# Patient Record
Sex: MALE | Race: WHITE | NOT HISPANIC OR LATINO | Employment: FULL TIME | URBAN - METROPOLITAN AREA
[De-identification: names, ages, dates, MRNs, and addresses within clinical notes are randomized per-mention and may not be internally consistent; named-entity substitution may affect disease eponyms.]

---

## 2017-03-20 ENCOUNTER — TELEPHONE (OUTPATIENT)
Dept: INTERNAL MEDICINE | Facility: CLINIC | Age: 42
End: 2017-03-20

## 2017-03-20 NOTE — TELEPHONE ENCOUNTER
3/20/2017    Call Regarding ReattributionPhysical    Attempt 1    Message on voicemail     Comments:       Outreach   CC

## 2017-03-29 NOTE — TELEPHONE ENCOUNTER
3/29/2017     Call Regarding ReattributionPhysical  Attempt 2     Message on voicemail   Comments:         Outreach   XAVIER

## 2017-04-04 NOTE — TELEPHONE ENCOUNTER
4/4/2017     Call Regarding ReattributionPhysical  Attempt 3     Message on voicemail   Comments:         Outreach   XAVIER

## 2019-03-08 ENCOUNTER — OFFICE VISIT (OUTPATIENT)
Dept: FAMILY MEDICINE | Facility: CLINIC | Age: 44
End: 2019-03-08
Payer: COMMERCIAL

## 2019-03-08 VITALS
TEMPERATURE: 96.4 F | WEIGHT: 182 LBS | HEIGHT: 69 IN | RESPIRATION RATE: 20 BRPM | DIASTOLIC BLOOD PRESSURE: 70 MMHG | HEART RATE: 89 BPM | BODY MASS INDEX: 26.96 KG/M2 | SYSTOLIC BLOOD PRESSURE: 120 MMHG | OXYGEN SATURATION: 98 %

## 2019-03-08 DIAGNOSIS — S89.92XA KNEE INJURY, LEFT, INITIAL ENCOUNTER: ICD-10-CM

## 2019-03-08 DIAGNOSIS — Z83.3 FAMILY HISTORY OF DIABETES MELLITUS: ICD-10-CM

## 2019-03-08 DIAGNOSIS — Z82.49 FAMILY HISTORY OF ISCHEMIC HEART DISEASE: ICD-10-CM

## 2019-03-08 DIAGNOSIS — R73.01 IMPAIRED FASTING GLUCOSE: ICD-10-CM

## 2019-03-08 DIAGNOSIS — E66.3 OVERWEIGHT (BMI 25.0-29.9): ICD-10-CM

## 2019-03-08 DIAGNOSIS — E78.2 MIXED HYPERLIPIDEMIA: ICD-10-CM

## 2019-03-08 DIAGNOSIS — Z12.5 SCREENING FOR PROSTATE CANCER: ICD-10-CM

## 2019-03-08 DIAGNOSIS — Z00.00 ROUTINE GENERAL MEDICAL EXAMINATION AT A HEALTH CARE FACILITY: Primary | ICD-10-CM

## 2019-03-08 LAB
ALT SERPL W P-5'-P-CCNC: 46 U/L (ref 0–70)
CHOLEST SERPL-MCNC: 303 MG/DL
GLUCOSE SERPL-MCNC: 98 MG/DL (ref 70–99)
HDLC SERPL-MCNC: 42 MG/DL
LDLC SERPL CALC-MCNC: 214 MG/DL
NONHDLC SERPL-MCNC: 261 MG/DL
TRIGL SERPL-MCNC: 235 MG/DL

## 2019-03-08 PROCEDURE — 36415 COLL VENOUS BLD VENIPUNCTURE: CPT | Performed by: FAMILY MEDICINE

## 2019-03-08 PROCEDURE — 99396 PREV VISIT EST AGE 40-64: CPT | Performed by: FAMILY MEDICINE

## 2019-03-08 PROCEDURE — G0103 PSA SCREENING: HCPCS | Performed by: FAMILY MEDICINE

## 2019-03-08 PROCEDURE — 80061 LIPID PANEL: CPT | Performed by: FAMILY MEDICINE

## 2019-03-08 PROCEDURE — 82947 ASSAY GLUCOSE BLOOD QUANT: CPT | Performed by: FAMILY MEDICINE

## 2019-03-08 PROCEDURE — 84460 ALANINE AMINO (ALT) (SGPT): CPT | Performed by: FAMILY MEDICINE

## 2019-03-08 PROCEDURE — 99214 OFFICE O/P EST MOD 30 MIN: CPT | Mod: 25 | Performed by: FAMILY MEDICINE

## 2019-03-08 RX ORDER — ATORVASTATIN CALCIUM 40 MG/1
40 TABLET, FILM COATED ORAL DAILY
Qty: 90 TABLET | Refills: 0 | Status: SHIPPED | OUTPATIENT
Start: 2019-03-08 | End: 2019-05-29

## 2019-03-08 RX ORDER — ATORVASTATIN CALCIUM 40 MG/1
40 TABLET, FILM COATED ORAL DAILY
Qty: 90 TABLET | Refills: 3 | Status: CANCELLED | OUTPATIENT
Start: 2019-03-08

## 2019-03-08 ASSESSMENT — MIFFLIN-ST. JEOR: SCORE: 1702.99

## 2019-03-08 NOTE — LETTER
[facility]  [address]  [place] [address]  [phone]                                                                                                           KENAN CAGLE  [address] [place] [address]    March 11, 2019      Dear KENAN,     Please see attached lab results   All of your lab results are normal, except as noted below.     The following are explanations of some of our lab tests     THIS DOES NOT MEAN THAT YOU HAD ALL OF THESE DONE     Please continue on the same medications unless   a change is noted above     These are some general explanations for tests:     HDL Cholesterol - is the good cholesterol and it is good to have it high.   LDL cholesterol is the bad ch   olesterol and it is good to have it low.   It is recommended to have LDL less than 130 for people with hypertension and to have it less than 100 for people with heart disease, diabetes and chronic kidney disease.   Triglycerides are another type of lipid a   nd can also cause heart disease so should be kept low. #####   ####both of the lipids above are high ##### hopefully the statin you are restarting will lower these on rechek in 6 weeks####     Thyroid tests are TSH, T4, T3   Glucose is sugar   A1c is a test that gives us an idea about how well was controlled the diabetes for the last 3 months.   PSA stands for Prostate Specific Antigen and    it can be elevated with prostate cancer or prostate inflammation.     Thank you for choosing [facility].  We appreciate the opportunity to serve you and look forward to supporting your healthcare needs in the future.    If you have any questions or concerns, please call me or my staff at [phone].      Sincerely,    Karla Armando MD    Results for orders placed or performed in visit on 03/08/19   GLUCOSE   Result Value Ref Range    Glucose 98 70 - 99 mg/dL   ALT   Result Value  Ref Range    ALT 46 0 - 70 U/L   Lipid panel reflex to direct LDL Fasting   Result Value Ref Range    Cholesterol 303 (H) <200 mg/dL    Triglycerides 235 (H) <150 mg/dL    HDL Cholesterol 42 >39 mg/dL    LDL Cholesterol Calculated 214 (H) <100 mg/dL    Non HDL Cholesterol 261 (H) <130 mg/dL   Prostate spec antigen screen   Result Value Ref Range    PSA 0.73 0 - 4 ug/L

## 2019-03-08 NOTE — NURSING NOTE
"Chief Complaint   Patient presents with     Physical     /70   Pulse 89   Temp 96.4  F (35.8  C) (Tympanic)   Resp 20   Ht 1.74 m (5' 8.5\")   Wt 82.6 kg (182 lb)   SpO2 98%   BMI 27.27 kg/m   Estimated body mass index is 27.27 kg/m  as calculated from the following:    Height as of this encounter: 1.74 m (5' 8.5\").    Weight as of this encounter: 82.6 kg (182 lb).  BP completed using cuff size: maru Finley CMA    Health Maintenance Due   Topic Date Due     PHQ-2 Q1 YR  07/01/1987     HIV SCREEN (SYSTEM ASSIGNED)  07/01/1993     LIPID MONITORING Q1 YEAR  04/25/2015     INFLUENZA VACCINE (1) 09/01/2018     Health Maintenance reviewed at today's visit patient asked to schedule/complete:   Immunizations:  Patient agrees to schedule    "

## 2019-03-08 NOTE — PATIENT INSTRUCTIONS
Preventive Health Recommendations  Male Ages 40 to 49    Yearly exam:             See your health care provider every year in order to  o   Review health changes.   o   Discuss preventive care.    o   Review your medicines if your doctor has prescribed any.    You should be tested each year for STDs (sexually transmitted diseases) if you re at risk.     Have a cholesterol test every 5 years.     Have a colonoscopy (test for colon cancer) if someone in your family has had colon cancer or polyps before age 50.     After age 45, have a diabetes test (fasting glucose). If you are at risk for diabetes, you should have this test every 3 years.      Talk with your health care provider about whether or not a prostate cancer screening test (PSA) is right for you.    Shots: Get a flu shot each year. Get a tetanus shot every 10 years.     Nutrition:    Eat at least 5 servings of fruits and vegetables daily.     Eat whole-grain bread, whole-wheat pasta and brown rice instead of white grains and rice.     Get adequate Calcium and Vitamin D.     Lifestyle    Exercise for at least 150 minutes a week (30 minutes a day, 5 days a week). This will help you control your weight and prevent disease.     Limit alcohol to one drink per day.     No smoking.     Wear sunscreen to prevent skin cancer.     See your dentist every six months for an exam and cleaning.      1.  Weight Loss Tips  1. Do not eat after 6 hrs before your expected bedtime  2. Have your heaviest meal for breakfast, a slightly lighter meal at lunch and a snack 6 hrs before bed  3. No sugar/calorie drinks except milk ie no fruit juice, pop, alcohol.  4. Drink milk 30min before meals to decrease your hunger. Also it is excellent as part of your last meal of the day snack  5. Drink lots of water  6. Increase fiber in diet: all bran cereal, salads, popcorn etc  7. Have only one small serving of fruit a day about 1/2 cup (as this is high in sugar)  8. EXERCISE is the bottom  line. Without it, you will gain weight even on a low calorie diet. Best if done 2-3X a day as can    Being overweight contributes to high blood pressure and high cholesterol, both of which cause heart attacks, strokes and kidney failure, prediabetes and diabetes, arthritis, and liver disease     3. .The only way known to prevent diabetes or keep it from getting worse is exercise, 20-40 minutes 3 times a day around the time of meals as your insulin is wearing out  You need to get rid of the sugar using your muscles     5.  The future lab has been ordered. Please call us at [phone] to make a lab appointment.     In 6-8 weeks by 5-11-19    6. Please do your breast exam every mo, when you  Change the  calendar page or set an alarm on your cell phone Do a  visual check for dimples, inversion or indentation or any different position of the nipple Feel manually  for any 1cm or larger  size mass ie about the size of an almond Be sure to cover the entire area of both breasts : this extends back to the back on either side and from the collar bone to the bottom of the breasts where you can begin to feel ribs.  Men are advised to do this exam also as they now have a higher rate of breast cancer , like women do .     5. You could do leg lifts to strengthen your knees     6. [facility]  [facility] [address]

## 2019-03-08 NOTE — PROGRESS NOTES
SUBJECTIVE:   CC: KENAN CAGLE is an 43 year old male who presents for preventative health visit.     Physical   Annual:     Getting at least 3 servings of Calcium per day:  Yes    Bi-annual eye exam:  NO    Dental care twice a year:  Yes    Sleep apnea or symptoms of sleep apnea:  None    Diet:  Regular (no restrictions)    Frequency of exercise:  2-3 days/week    Duration of exercise:  15-30 minutes    Taking medications regularly:  Yes    Medication side effects:  None    Additional concerns today:  Yes    PHQ-2 Total Score: 0    Musculoskeletal problem/pain: RT post lat  knee pain c subcutaneous heme from tendon rupture bleed   & hemangioma med post       Duration: mid feb , 2019     Plays hockey 2x a wk     Gibson/felt a pop & hurt immediately and only slightly better post 1-2 days but increased pain with twisting     Description  Location: above     Intensity:  moderate, 7/10--limits his hockey playing as the pain inhibits certain movements    Accompanying signs and symptoms: none    History  Previous similar problem: no   Previous evaluation:  none    Precipitating or alleviating factors:  Trauma or overuse: YES  Aggravating factors include: twisting while wt bearing     Therapies tried and outcome: rest/inactivity    MIxed Hyperlipidemia Follow-Up      Rate your low fat/cholesterol diet?: fair    Taking statin?  Stopped due to did not f/u with MD     Atorvastatin 40mgm off & on since 2015     LDL down to 106 in 2014--when presumably on the statin     Trig = 188+and was 164 in 2014    Other lipid medications/supplements?:  Fish oil/Omega 3, dose 1000mgm with side effects-did not like            Today's PHQ-2 Score:   PHQ-2 ( 1999 [employer]) 3/8/2019   Q1: Little interest or pleasure in doing things 0   Q2: Feeling down, depressed or hopeless 0   PHQ-2 Score 0   Q1: Little interest or pleasure in doing things Not at all   Q2: Feeling down, depressed or hopeless Not at all   PHQ-2 Score 0     Abuse: Current or  "Past(Physical, Sexual or Emotional)- No  Do you feel safe in your environment? Yes    Social History     Tobacco Use     Smoking status: Never Smoker     Smokeless tobacco: Never Used   Substance Use Topics     Alcohol use: Yes     Alcohol/week: 2.0 oz     Comment: once weekly, maybe two drinks with dinner     Alcohol Use 3/8/2019   If you drink alcohol do you typically have greater than 3 drinks per day OR greater than 7 drinks per week? Yes   No flowsheet data found.    Last PSA: No results found for: PSA    Reviewed orders with patient. Reviewed health maintenance and updated orders accordingly - Yes  Labs reviewed in EPIC    Reviewed and updated as needed this visit by clinical staff  Tobacco  Allergies  Meds  Problems  Med Hx  Surg Hx  Fam Hx  Soc Hx          Reviewed and updated as needed this visit by Provider  Tobacco  Allergies  Meds  Problems  Med Hx  Surg Hx  Fam Hx            Review of Systems  CONSTITUTIONAL: NEGATIVE for fever, chills, change in weight  INTEGUMENTARY/SKIN: NEGATIVE for worrisome rashes, moles or lesions  EYES: NEGATIVE for vision changes or irritation  ENT: NEGATIVE for ear, mouth and throat problems  RESP: NEGATIVE for significant cough or SOB  CV: NEGATIVE for chest pain, palpitations or peripheral edema  GI: NEGATIVE for nausea, abdominal pain, heartburn, or change in bowel habits   male: negative for dysuria, hematuria, decreased urinary stream, erectile dysfunction, urethral discharge  MUSCULOSKELETAL:NEGATIVE for significant arthralgias or myalgia, POSITIVE  for  and joint stiffness Rt knee   NEURO: NEGATIVE for weakness, dizziness or paresthesias  ENDOCRINE: NEGATIVE for temperature intolerance, skin/hair changes  HEME/ALLERGY/IMMUNE: NEGATIVE for bleeding problems  PSYCHIATRIC: NEGATIVE for changes in mood or affect    OBJECTIVE:   /70   Pulse 89   Temp 96.4  F (35.8  C) (Tympanic)   Resp 20   Ht 1.74 m (5' 8.5\")   Wt 82.6 kg (182 lb)   SpO2 98%   " BMI 27.27 kg/m      Physical Exam  GENERAL: healthy, alert, no distress and over weight  EYES: Eyes grossly normal to inspection, PERRL and conjunctivae and sclerae normal  NECK: no adenopathy, no asymmetry, masses, or scars and thyroid normal to palpation  RESP: lungs clear to auscultation - no rales, rhonchi or wheezes  CV: regular rate and rhythm, normal S1 S2, no S3 or S4, no murmur, click or rub, no peripheral edema and peripheral pulses strong  ABDOMEN: soft, nontender, no hepatosplenomegaly, no masses and bowel sounds normal  MS: no gross musculoskeletal defects noted, no edema POS 8 cm disc of yellow bruise on skin of Rt knee lat at level of knee joint and proximal --nontender but pain with eversion of ft andlower leg -no edema   SKIN: no suspicious lesions or rashes POS map like red capillary form on post med Rt knee -nontender   NEURO: Normal strength and tone, mentation intact and speech normal  PSYCH: mentation appears normal, affect normal/bright  LYMPH: no cervical, supraclavicular, axillary, or inguinal adenopathy    Diagnostic Test Results:  Results for orders placed or performed in visit on 04/25/14   Lipid Profile   Result Value Ref Range    Cholesterol 179 <200 mg/dL    Triglycerides 164 (H) 0 - 150 mg/dL    HDL Cholesterol 40 (L) >40 mg/dL    LDL Cholesterol Calculated 106 0 - 129 mg/dL    VLDL-Cholesterol 33 (H) 0 - 30 mg/dL    Cholesterol/HDL Ratio 4.5 0.0 - 5.0   ALT   Result Value Ref Range    ALT 58 0 - 70 U/L   AST   Result Value Ref Range    AST 40 0 - 45 U/L       ASSESSMENT/PLAN:       ICD-10-CM    1. Routine general medical examination at a health care facility Z00.00    2. Knee injury, left, initial encounter-tendon pull post lat in mid 2-19  S89.92XA    3. Mixed hyperlipidemia-issue of restarting statin E78.2 ALT     Lipid panel reflex to direct LDL Fasting   4. Impaired fasting glucose R73.01 GLUCOSE   5. Family history of diabetes mellitus: mom at 60  Z83.3    6. Family history of  "ischemic heart disease: fr w MI 56y/o Z82.49    7. Screening for prostate cancer Z12.5 Prostate spec antigen screen   8. Overweight (BMI 25.0-29.9) E66.3 ALT       COUNSELING:   Reviewed preventive health counseling, as reflected in patient instructions       Regular exercise       Healthy diet/nutrition       Vision screening    BP Readings from Last 1 Encounters:   03/08/19 120/70     Estimated body mass index is 27.27 kg/m  as calculated from the following:    Height as of this encounter: 1.74 m (5' 8.5\").    Weight as of this encounter: 82.6 kg (182 lb).      Weight management plan: Discussed healthy diet and exercise guidelines     reports that  has never smoked. he has never used smokeless tobacco.     Patient Instructions     Preventive Health Recommendations  Male Ages 40 to 49    Yearly exam:             See your health care provider every year in order to  o   Review health changes.   o   Discuss preventive care.    o   Review your medicines if your doctor has prescribed any.    You should be tested each year for STDs (sexually transmitted diseases) if you re at risk.     Have a cholesterol test every 5 years.     Have a colonoscopy (test for colon cancer) if someone in your family has had colon cancer or polyps before age 50.     After age 45, have a diabetes test (fasting glucose). If you are at risk for diabetes, you should have this test every 3 years.      Talk with your health care provider about whether or not a prostate cancer screening test (PSA) is right for you.    Shots: Get a flu shot each year. Get a tetanus shot every 10 years.     Nutrition:    Eat at least 5 servings of fruits and vegetables daily.     Eat whole-grain bread, whole-wheat pasta and brown rice instead of white grains and rice.     Get adequate Calcium and Vitamin D.     Lifestyle    Exercise for at least 150 minutes a week (30 minutes a day, 5 days a week). This will help you control your weight and prevent disease.     Limit " alcohol to one drink per day.     No smoking.     Wear sunscreen to prevent skin cancer.     See your dentist every six months for an exam and cleaning.      1.  Weight Loss Tips  1. Do not eat after 6 hrs before your expected bedtime  2. Have your heaviest meal for breakfast, a slightly lighter meal at lunch and a snack 6 hrs before bed  3. No sugar/calorie drinks except milk ie no fruit juice, pop, alcohol.  4. Drink milk 30min before meals to decrease your hunger. Also it is excellent as part of your last meal of the day snack  5. Drink lots of water  6. Increase fiber in diet: all bran cereal, salads, popcorn etc  7. Have only one small serving of fruit a day about 1/2 cup (as this is high in sugar)  8. EXERCISE is the bottom line. Without it, you will gain weight even on a low calorie diet. Best if done 2-3X a day as can    Being overweight contributes to high blood pressure and high cholesterol, both of which cause heart attacks, strokes and kidney failure, prediabetes and diabetes, arthritis, and liver disease     3. .The only way known to prevent diabetes or keep it from getting worse is exercise, 20-40 minutes 3 times a day around the time of meals as your insulin is wearing out  You need to get rid of the sugar using your muscles     5.  The future lab has been ordered. Please call us at [phone] to make a lab appointment.     In 6-8 weeks by 5-11-19    6. Please do your breast exam every mo, when you  Change the  calendar page or set an alarm on your cell phone Do a  visual check for dimples, inversion or indentation or any different position of the nipple Feel manually  for any 1cm or larger  size mass ie about the size of an almond Be sure to cover the entire area of both breasts : this extends back to the back on either side and from the collar bone to the bottom of the breasts where you can begin to feel ribs.  Men are advised to do this exam also as they now have a higher rate of breast cancer ,  like women do .     5. You could do leg lifts to strengthen your knees     6. [facility]  [facility] [address]     As this is important to pt to play hockey     Discussed all with pt  And the patient expresses understanding    Time spent with the patient 28mins, more than 50% in counseling and coordinating care, Re above medical problems.    1. HIGH LDL   -+ fam hx infr of MI at 55y/o   -on statin in past without problem   -realizes the risk and wants to restart   -discussed the f/u necessary to be sure we get the LDL down and the liver is OK & needs routine f/u   -needs to stay on the statin this time   -need to consider further cardiac work up     2. RT KNEE pain  -injury occurred at hockey with a sudden movement   -has subcutaneous heme-likely from a partially torn tendon that bled and the blood  came to the surface   - the red reticulated skin lesion more medially is more likely a hemangioma           Counseling Resources:  ATP IV Guidelines  Pooled Cohorts Equation Calculator  FRAX Risk Assessment  ICSI Preventive Guidelines  Dietary Guidelines for Americans, 2010  USDA's MyPlate  ASA Prophylaxis  Lung CA Screening    Karla Armando MD  [facility]

## 2019-03-09 LAB — PSA SERPL-ACNC: 0.73 UG/L (ref 0–4)

## 2019-03-10 NOTE — RESULT ENCOUNTER NOTE
Please see attached lab results  All of your lab results are normal, except as noted below.    The following are explanations of some of our lab tests    THIS DOES NOT MEAN THAT YOU HAD ALL OF THESE DONE    Please continue on the same medications unless   a change is noted above    These are some general explanations for tests:    Hgb is the blood iron level  WBC means White Blood Cells  Platelets are small blood cells that help with forming the blood clots along with other blood factors.  Electrolytes ar  e Sodium, Potassium, Calcium, Magnesium, Phosphorus.  Liver tests are: AST, ALT, Bilirubin, Alkaline Phosphatase.  Kidney tests are Creatinine, GFR.  HDL Cholesterol - is the good cholesterol and it is good to have it high.  LDL cholesterol is the bad ch  olesterol and it is good to have it low.  It is recommended to have LDL less than 130 for people with hypertension and to have it less than 100 for people with heart disease, diabetes and chronic kidney disease.  Triglycerides are another type of lipid a  nd can also cause heart disease so should be kept low. #####  ####both of the lipids above are high ##### hopefully the statin you are restarting will lower these on rechek in 6 weeks####    Thyroid tests are TSH, T4, T3  Glucose is sugar  A1c is a test that gives us an idea about how well was controlled the diabetes for the last 3 months.   PSA stands for Prostate Specific Antigen and   it can be elevated with prostate cancer or prostate inflammation.

## 2019-05-29 DIAGNOSIS — E78.2 MIXED HYPERLIPIDEMIA: Primary | ICD-10-CM

## 2019-05-29 NOTE — TELEPHONE ENCOUNTER
"ATORVASTATIN 40 MG TABLET  Last Written Prescription Date:  03/08/2019  Last Fill Quantity: 90,  # refills: 0   Last office visit: 3/8/2019 with prescribing provider:  03/08/2019   Future Office Visit:    Requested Prescriptions   Pending Prescriptions Disp Refills     atorvastatin (LIPITOR) 40 MG tablet [Pharmacy Med Name: ATORVASTATIN 40 MG TABLET] 90 tablet 0     Sig: TAKE 1 TABLET BY MOUTH DAILY, NEEDS LAB FOR MORE BY 6+-8 WEEKS       Statins Protocol Passed - 5/29/2019 10:34 AM        Passed - LDL on file in past 12 months     Recent Labs   Lab Test 03/08/19  1022   *             Passed - No abnormal creatine kinase in past 12 months     No lab results found.             Passed - Recent (12 mo) or future (30 days) visit within the authorizing provider's specialty     Patient had office visit in the last 12 months or has a visit in the next 30 days with authorizing provider or within the authorizing provider's specialty.  See \"Patient Info\" tab in inbasket, or \"Choose Columns\" in Meds & Orders section of the refill encounter.              Passed - Medication is active on med list        Passed - Patient is age 18 or older          "

## 2019-05-31 RX ORDER — ATORVASTATIN CALCIUM 40 MG/1
TABLET, FILM COATED ORAL
Qty: 90 TABLET | Refills: 0 | Status: SHIPPED | OUTPATIENT
Start: 2019-05-31 | End: 2019-06-06

## 2019-05-31 RX ORDER — EZETIMIBE 10 MG/1
10 TABLET ORAL DAILY
Qty: 90 TABLET | Refills: 0 | Status: SHIPPED | OUTPATIENT
Start: 2019-05-31 | End: 2019-08-31

## 2019-05-31 NOTE — TELEPHONE ENCOUNTER
LDL too hi > 200  On atorvastatin 40mgm   Will add zetia 10mgm and rechek lab in 6-8 weeks  Hard copy to station 2, BX    .

## 2019-05-31 NOTE — TELEPHONE ENCOUNTER
Patient scheduled for lab only appointment. States he has been on medication already for 6-8 weeks. No further action needed.

## 2019-06-05 DIAGNOSIS — E78.2 MIXED HYPERLIPIDEMIA: ICD-10-CM

## 2019-06-05 PROCEDURE — 84460 ALANINE AMINO (ALT) (SGPT): CPT | Performed by: FAMILY MEDICINE

## 2019-06-05 PROCEDURE — 36415 COLL VENOUS BLD VENIPUNCTURE: CPT | Performed by: FAMILY MEDICINE

## 2019-06-05 PROCEDURE — 80061 LIPID PANEL: CPT | Performed by: FAMILY MEDICINE

## 2019-06-06 DIAGNOSIS — E78.2 MIXED HYPERLIPIDEMIA: Primary | ICD-10-CM

## 2019-06-06 LAB
ALT SERPL W P-5'-P-CCNC: 58 U/L (ref 0–70)
CHOLEST SERPL-MCNC: 233 MG/DL
HDLC SERPL-MCNC: 47 MG/DL
LDLC SERPL CALC-MCNC: 121 MG/DL
NONHDLC SERPL-MCNC: 186 MG/DL
TRIGL SERPL-MCNC: 327 MG/DL

## 2019-06-06 RX ORDER — ATORVASTATIN CALCIUM 80 MG/1
80 TABLET, FILM COATED ORAL DAILY
Qty: 90 TABLET | Refills: 0 | Status: SHIPPED | OUTPATIENT
Start: 2019-06-06 | End: 2019-08-31

## 2019-08-31 DIAGNOSIS — E78.2 MIXED HYPERLIPIDEMIA: ICD-10-CM

## 2019-09-03 RX ORDER — EZETIMIBE 10 MG/1
TABLET ORAL
Qty: 90 TABLET | Refills: 1 | Status: SHIPPED | OUTPATIENT
Start: 2019-09-03 | End: 2020-06-23

## 2019-09-03 RX ORDER — ATORVASTATIN CALCIUM 80 MG/1
TABLET, FILM COATED ORAL
Qty: 90 TABLET | Refills: 1 | Status: SHIPPED | OUTPATIENT
Start: 2019-09-03 | End: 2020-06-23

## 2019-09-03 NOTE — TELEPHONE ENCOUNTER
"Requested Prescriptions   Pending Prescriptions Disp Refills     atorvastatin (LIPITOR) 80 MG tablet [Pharmacy Med Name: ATORVASTATIN 80 MG TABLET]  Last Written Prescription Date:  6/6/2019  Last Fill Quantity: 90 tablet,  # refills: 0   Last office visit: 3/8/2019 with prescribing provider:  MELISSA Armando   Future Office Visit:     90 tablet 0     Sig: TAKE 1 TABLET BY MOUTH EVERY DAY       Statins Protocol Passed - 8/31/2019 12:31 PM        Passed - LDL on file in past 12 months     Recent Labs   Lab Test 06/05/19  1016   *             Passed - No abnormal creatine kinase in past 12 months     No lab results found.             Passed - Recent (12 mo) or future (30 days) visit within the authorizing provider's specialty     Patient had office visit in the last 12 months or has a visit in the next 30 days with authorizing provider or within the authorizing provider's specialty.  See \"Patient Info\" tab in inbasket, or \"Choose Columns\" in Meds & Orders section of the refill encounter.              Passed - Medication is active on med list        Passed - Patient is age 18 or older        ezetimibe (ZETIA) 10 MG tablet [Pharmacy Med Name: EZETIMIBE 10 MG TABLET]  Last Written Prescription Date:  5/31/2019  Last Fill Quantity: 90 tablet,  # refills: 0   Last office visit: 3/8/2019 with prescribing provider:  MELISSA Armando   Future Office Visit:     90 tablet 0     Sig: TAKE 1 TABLET BY MOUTH EVERY DAY       Antihyperlipidemic agents Passed - 8/31/2019 12:31 PM        Passed - Lipid panel on file in past 12 mos     Recent Labs   Lab Test 06/05/19  1016  04/25/14  1039   CHOL 233*   < > 179   TRIG 327*   < > 164*   HDL 47   < > 40*   *   < > 106   NHDL 186*   < >  --    VLDL  --   --  33*   CHOLHDLRATIO  --   --  4.5    < > = values in this interval not displayed.               Passed - Normal serum ALT on record in past 12 mos     Recent Labs   Lab Test 06/05/19  1016   ALT 58             Passed - Recent (12 " "mo) or future (30 days) visit within the authorizing provider's specialty     Patient had office visit in the last 12 months or has a visit in the next 30 days with authorizing provider or within the authorizing provider's specialty.  See \"Patient Info\" tab in inbasket, or \"Choose Columns\" in Meds & Orders section of the refill encounter.              Passed - Medication is active on med list        Passed - Patient is age 18 years or older           "

## 2019-09-28 ENCOUNTER — HEALTH MAINTENANCE LETTER (OUTPATIENT)
Age: 44
End: 2019-09-28

## 2020-03-23 ENCOUNTER — TRANSFERRED RECORDS (OUTPATIENT)
Dept: HEALTH INFORMATION MANAGEMENT | Facility: CLINIC | Age: 45
End: 2020-03-23

## 2020-04-16 ENCOUNTER — TELEPHONE (OUTPATIENT)
Dept: FAMILY MEDICINE | Facility: CLINIC | Age: 45
End: 2020-04-16

## 2020-04-16 DIAGNOSIS — E78.2 MIXED HYPERLIPIDEMIA: ICD-10-CM

## 2020-04-16 NOTE — TELEPHONE ENCOUNTER
"Requested Prescriptions   Pending Prescriptions Disp Refills     ezetimibe (ZETIA) 10 MG tablet [Pharmacy Med Name: EZETIMIBE 10 MG TABLET] 90 tablet 1     Sig: TAKE 1 TABLET BY MOUTH EVERY DAY       Antihyperlipidemic agents Failed - 4/16/2020 12:25 AM        Failed - Recent (12 mo) or future (30 days) visit within the authorizing provider's specialty     Patient has had an office visit with the authorizing provider or a provider within the authorizing providers department within the previous 12 mos or has a future within next 30 days. See \"Patient Info\" tab in inbasket, or \"Choose Columns\" in Meds & Orders section of the refill encounter.              Passed - Lipid panel on file in past 12 mos     Recent Labs   Lab Test 06/05/19  1016  04/25/14  1039   CHOL 233*   < > 179   TRIG 327*   < > 164*   HDL 47   < > 40*   *   < > 106   NHDL 186*   < >  --    VLDL  --   --  33*   CHOLHDLRATIO  --   --  4.5    < > = values in this interval not displayed.               Passed - Normal serum ALT on record in past 12 mos     Recent Labs   Lab Test 06/05/19  1016   ALT 58             Passed - Medication is active on med list        Passed - Patient is age 18 years or older           atorvastatin (LIPITOR) 80 MG tablet [Pharmacy Med Name: ATORVASTATIN 80 MG TABLET] 90 tablet 1     Sig: TAKE 1 TABLET BY MOUTH EVERY DAY       Statins Protocol Failed - 4/16/2020 12:25 AM        Failed - Recent (12 mo) or future (30 days) visit within the authorizing provider's specialty     Patient has had an office visit with the authorizing provider or a provider within the authorizing providers department within the previous 12 mos or has a future within next 30 days. See \"Patient Info\" tab in inbasket, or \"Choose Columns\" in Meds & Orders section of the refill encounter.              Passed - LDL on file in past 12 months     Recent Labs   Lab Test 06/05/19  1016   *             Passed - No abnormal creatine kinase in past 12 " months     No lab results found.             Passed - Medication is active on med list        Passed - Patient is age 18 or older           Routing refill request to provider for review/approval because:  Patient needs to be seen because it has been more than 1 year since last office visit.

## 2020-04-17 RX ORDER — ATORVASTATIN CALCIUM 80 MG/1
TABLET, FILM COATED ORAL
Qty: 90 TABLET | Refills: 1 | OUTPATIENT
Start: 2020-04-17

## 2020-04-17 RX ORDER — EZETIMIBE 10 MG/1
TABLET ORAL
Qty: 90 TABLET | Refills: 1 | OUTPATIENT
Start: 2020-04-17

## 2020-04-17 NOTE — TELEPHONE ENCOUNTER
Tried to call pt, no answer and no way to LVM. Will try again later. Pt not actively viewing [email or web address] so I did not send a message that way.

## 2020-04-17 NOTE — TELEPHONE ENCOUNTER
Called pt again and no answer and no way to LVM. Closing this encounter until patient reaches out to us.

## 2020-06-23 ENCOUNTER — VIRTUAL VISIT (OUTPATIENT)
Dept: FAMILY MEDICINE | Facility: CLINIC | Age: 45
End: 2020-06-23
Payer: COMMERCIAL

## 2020-06-23 DIAGNOSIS — E78.2 MIXED HYPERLIPIDEMIA: Primary | ICD-10-CM

## 2020-06-23 DIAGNOSIS — Z11.4 ENCOUNTER FOR SCREENING FOR HIV: ICD-10-CM

## 2020-06-23 PROCEDURE — 99213 OFFICE O/P EST LOW 20 MIN: CPT | Mod: GT | Performed by: INTERNAL MEDICINE

## 2020-06-23 RX ORDER — ATORVASTATIN CALCIUM 80 MG/1
80 TABLET, FILM COATED ORAL DAILY
Qty: 90 TABLET | Refills: 1 | Status: SHIPPED | OUTPATIENT
Start: 2020-06-23 | End: 2021-01-07

## 2020-06-23 RX ORDER — EZETIMIBE 10 MG/1
10 TABLET ORAL DAILY
Qty: 90 TABLET | Refills: 1 | Status: SHIPPED | OUTPATIENT
Start: 2020-06-23 | End: 2021-01-07

## 2020-06-23 NOTE — PATIENT INSTRUCTIONS
Please do the lab work ordered. Medications sent to the pharmacy.     Please take the medications regularly and do the labs after 4 weeks for improvement.    =============================    Patient Education     Cholesterol Medicines    Your healthcare provider has prescribed medicine to help control your cholesterol. This sheet tells you how cholesterol affects your health. It also explains how medicines can help improve your cholesterol levels.  Understanding cholesterol  Cholesterol is a type of fat (lipid) that s carried in the blood. Your body makes cholesterol in the liver. You also get it from certain foods. Your body needs some cholesterol to stay healthy. But high cholesterol makes more plaque build up in blood vessels. Plaque is a fatty substance. Over time, this plaque narrows and hardens the blood vessels. This reduces or blocks blood flow in these vessels and raises your risk for heart attack (acute myocardial infarction, or AMI), stroke, and other health problems. This is known as atherosclerotic cardiovascular disease (ASCVD).  Types of lipids  Your blood has 3 key fats:    LDL (low-density lipoprotein) cholesterol. This is called  bad  because it can cause plaque to build up in the blood vessels.    HDL (high-density lipoprotein) cholesterol. This is called  good  because it helps get rid of harmful cholesterol in the bloodstream.    Triglycerides. Your body uses this form of fat to store energy. Like LDL cholesterol, this fat can cause plaque to build up in the blood vessels.  Healthy cholesterol levels  You can find out your levels by having a blood test. High blood cholesterol is a risk factor for getting ASCVD. Talk to your healthcare provider about what levels are best for you. To find out more about cholesterol levels, visit www.heart.org/cholesterol.  You may need your cholesterol levels checked at regular intervals. This is to see if you are meeting the cholesterol goals you and your  provider have agreed on. Make sure you know what this schedule will be and how to get ready for this test.  Types of cholesterol medicines  Medicines can help control the amount of cholesterol in the blood. There are several types. Each controls cholesterol in a different way. They also have different effects in terms of how well they work to lower cholesterol and reduce death rates. Discuss these effects with your healthcare provider. Your healthcare provider will prescribe the type of medicine that is best for you. Medicines may be used alone or combined. The main types are:    Statins (HMG-CoA reductase inhibitors). Statins are thought to be the best at lowering cholesterol. They do this by keeping your body from making cholesterol. Benefits: Statins lower LDL cholesterol. They also slightly raise HDL cholesterol and lower triglycerides.    Selective cholesterol absorption inhibitors. These prevent your body from taking cholesterol from food. They may be prescribed to use alone or with a statin. Benefits: These medicines lower LDL cholesterol. They also slightly raise HDL cholesterol and lower triglycerides.    Resins (bile acid sequestrants or bile acid-binding medicines). Resins help you get rid of cholesterol through the intestines. They work by binding to bile. Bile is a substance that helps the body digest food. Your body uses cholesterol to make bile. Normally, most bile is absorbed by the body during digestion. But when bile is bound to resin, it is eliminated from the body. So the body must make more bile. To do this, the body takes up more cholesterol from the blood. Benefits: Resins lower LDL cholesterol.    Fibrates (fibric acid derivatives). These are best at cutting back on how much triglycerides your body makes. They don t work well to lower LDL. Benefits: Fibrates lower triglycerides. They also raise HDL cholesterol.    Niacin (nicotinic acid). Niacin (vitamin B-3) affects how the liver makes blood  fats. But don t use over-the-counter niacin for cholesterol problems. It isn t regulated by the FDA. Benefits: Niacin raises HDL cholesterol. It also lowers triglycerides and LDL cholesterol.    Omega-3 fatty acids. These reduce the amount of triglycerides your body makes. They also help to clear these lipids from the blood. Omega-3 fatty acids are found in many foods. These include salmon and other oily fish, and walnuts. Your healthcare provider may prescribe these fatty acids in capsule form. Benefits: Omega-3s lower triglycerides. (Note: They may increase LDL cholesterol in some patients.)     PCSK9 inhibitors. These medicines lower LDL cholesterol levels by breaking down the chemicals in the liver that control making LDL cholesterol. These medicines are given by an injection. They are used for people who have an inherited form of high cholesterol (familial hypercholesterolemia). They are also for people who have a hard time controlling their cholesterol with other medicines.  High-risk groups  Some people may need to take medicines called statins to control their cholesterol. This is in addition to eating a healthy diet and getting regular exercise.  Statins can help you stay healthy. They can also help prevent a heart attack or stroke. You may need to take a statin if you are in one of these groups:    Adults who have had a heart attack or stroke. Or adults who have had peripheral vascular disease, a ministroke (transient ischemic attack), or stable or unstable angina. This group also includes people who have had a procedure to restore blood flow through a blocked artery. These procedures include percutaneous coronary intervention, angioplasty, stent, and open-heart bypass surgery.    Adults who have diabetes. Or adults who are at higher risk of having a heart attack or stroke and have an LDL cholesterol level of 70 to 189 mg/dL    Adults who are 21 years old or older and have an LDL cholesterol level of 190  mg/dL or higher.  If you are in a high-risk group, talk with your healthcare provider about your treatment goals. Make sure you understand why these goals are important, based on your own health history and your family history of heart disease or high cholesterol.  Make a plan to have regular cholesterol checks. You may need to fast before getting this test. Also ask your provider about any side effects your medicines may cause. Let your provider know about any side effects you have. You may need to take more than one medicine to reach the cholesterol goals that you and your provider decide on.  Taking cholesterol medicines  Take your medication exactly as your healthcare provider tells you to. This will help it work best. Here are tips for taking cholesterol medicines:    Know when and how to take your medicines. Some may need to be taken with food. Others may need to be taken on an empty stomach or at a certain time of day.    Stick to a schedule. Try the following:  ? Don t skip doses or stop taking your medicine. This is important even if you feel better or if your cholesterol numbers improve.  ? Set things up to help you remember. For instance, work taking your medicines into your routine. You could plan to take them when you get up in the morning or when you go to bed at night.  ? Keep track of what you take. You may take a few different medicines. If so, a list or chart can help you take the right pills at the right time. A pillbox with days of the week or times of day is also a good tool for keeping track.    Prevent medicine interactions. Some medicines and supplements can interact with one another. This means they affect how other medicines work when taken together. Be sure to tell your healthcare provider about all other medicines you take. This includes vitamins, herbs, and over-the-counter medicines.    Know how to deal with side effects. Many people have side effects when they first start taking a  medicine. These are things like headache and stomach upset. Side effects should go away in a few weeks. Tell your healthcare provider about any side effects you have. Certain side effects should be reported to your healthcare provider right away. These include yellowing of the eyes and blurred vision. Also report muscle aches and breathing problems.  If you are pregnant or breastfeeding, tell your health care provider before taking any cholesterol medicines.  A healthy lifestyle  Your healthcare provider will help you make changes your lifestyle if needed. These changes are needed to help you lower your cholesterol and keep the ASCVD from getting worse. Things you may need to work on are:  Diet  Your healthcare provider will give you information on changes that you may need to make to your diet. You may need to see a registered dietitian or nutritionist for help with these changes. You might be asked to:    Eat less meat containing saturated fat and high levels of cholesterol    Eat less sodium (salt), especially if you have high blood pressure    Eat more fresh vegetables and fruits    Eat lean protein, like fish, chicken and turkey, and beans and peas    Eat less processed meats, like deli meats, sausage, and pepperoni    Choose non-fat and low-fat milk, yogurt and cheese    Use vegetable and nut oils instead of butter, shortening, and margarine    Limit sweets and packaged foods, like chips, cookies, and baked goods    Limit eating out at restaurants and eating fast foods  Physical activity  Your healthcare provider may ask you to be more active. Depending on your health, your provider may recommend that you get moderate to vigorous intensity exercise for at least 40 minutes each day, 3 to 4 days each week. Some examples of moderate to vigorous exercise are:    Walking at a brisk pace, about 3-4 miles per hour    Jogging or running    Riding a bicycle or stationary bike    Swimming or water  aerobics    Dancing    Hiking    Martial arts    Tennis  You may not be able to do 40 minutes right away. You may have to start with 5 to 10 minutes a day, 2 times a day, and then keep adding to it until you can do 40 minutes.  Weight management  If you are overweight or obese, your healthcare provider may tell you to lose weight and lower your BMI (body mass index). Changing your diet and getting more exercise can help. Controlling the number of calories you eat is the best way to lose weight.  Smoking  If you smoke, quit now. Ask your health care provider for information on medications that can help you fight cravings. Enroll in a stop-smoking program to improve your chances of success. Ask your provider for smoking-cessation referrals and products.  Stress  Learn ways to help you deal with stress in your home and work life. Here are a few ideas:    Take a yoga class. You can find classes at local community centers or on the Internet.    Get regular exercise. Exercise helps your mind let go of problems and release stress in your muscles.    Deep breathing. Take a few minutes several times a day to just sit quietly and breathe. Concentrate on the air going into and out of your body.    Talk with loved ones. Take a few minutes each day to connect with people that make you feel supported.  Date Last Reviewed: 2/1/2017 2000-2019 [employer] [address] [facility], [place] [address]. All rights reserved. This information is not intended as a substitute for professional medical care. Always follow your healthcare professional's instructions.

## 2020-06-23 NOTE — PROGRESS NOTES
"KENAN CAGLE is a 44 year old male who is being evaluated via a billable video visit.      The patient has been notified of following:     \"This video visit will be conducted via a call between you and your physician/provider. We have found that certain health care needs can be provided without the need for an in-person physical exam.  This service lets us provide the care you need with a video conversation.  If a prescription is necessary we can send it directly to your pharmacy.  If lab work is needed we can place an order for that and you can then stop by our lab to have the test done at a later time.    Video visits are billed at different rates depending on your insurance coverage.  Please reach out to your insurance provider with any questions.    If during the course of the call the physician/provider feels a video visit is not appropriate, you will not be charged for this service.\"    Patient has given verbal consent for Video visit? Yes    Will anyone else be joining your video visit? No     Please send invite link to # [phone].        Subjective     KENAN CAGLE is a 44 year old male who presents today via video visit for the following health issues:    HPI  Hyperlipidemia Follow-Up      Are you regularly taking any medication or supplement to lower your cholesterol?   Yes- Atorvastatin and Zetia    Are you having muscle aches or other side effects that you think could be caused by your cholesterol lowering medication?  No      How many servings of fruits and vegetables do you eat daily?  2-3    On average, how many sweetened beverages do you drink each day (Examples: soda, juice, sweet tea, etc.  Do NOT count diet or artificially sweetened beverages)?   0    How many days per week do you exercise enough to make your heart beat faster? 3 or less    How many minutes a day do you exercise enough to make your heart beat faster? 10 - 19    How many days per week do you miss taking your medication? " 0      Video Start Time: Start: 06/23/2020 02:08 pm     Mixed hyperlipidemia  This is a chronic health problem that is uncontrolled with current medications and lifestyle measures. Last lab work in 06/2019 abnormal. Currently on combination of Atorvastatin and Zetia. Risk factors of FH of premature CAD in father. Pt has been diagnosed with HLD since 5 yrs and Zetia added last year. He states that he was out of refills and not been on the medications for more than a month.      Patient Active Problem List   Diagnosis     Mixed hyperlipidemia     Impaired fasting glucose     Screening for prostate cancer     Family history of ischemic heart disease: fr w MI 56y/o     Family history of diabetes mellitus: mom at 60      Past Surgical History:   Procedure Laterality Date     HC TOOTH EXTRACTION W/FORCEP      Dodson teeth, also bridge, root canal work       Social History     Tobacco Use     Smoking status: Never Smoker     Smokeless tobacco: Never Used   Substance Use Topics     Alcohol use: Yes     Alcohol/week: 3.3 standard drinks     Comment: once weekly, maybe two drinks with dinner     Family History   Problem Relation Age of Onset     Diabetes Mother         Born 1948.     C.A.D. Father         Born 1947. Had MI at about age 54.     Lipids Father      C.A.D. Maternal Grandfather      Cerebrovascular Disease Paternal Grandfather         GGFa     Cerebrovascular Disease Sister         Born 1981. Had slight stroke thought related to smoking/OCP's     Family History Negative Brother         Born 1978     Family History Negative Brother         Born 1983     Cancer - colorectal No family hx of      Prostate Cancer No family hx of            Reviewed and updated as needed this visit by Provider         Review of Systems   Constitutional, HEENT, cardiovascular, pulmonary, GI, , musculoskeletal, neuro, skin, endocrine and psych systems are negative, except as otherwise noted.      Objective    There were no vitals taken  "for this visit.  Estimated body mass index is 27.27 kg/m  as calculated from the following:    Height as of 3/8/19: 1.74 m (5' 8.5\").    Weight as of 3/8/19: 82.6 kg (182 lb).  Physical Exam     GENERAL: Healthy, alert and no distress  EYES: Eyes grossly normal to inspection.  No discharge or erythema, or obvious scleral/conjunctival abnormalities.  RESP: No audible wheeze, cough, or visible cyanosis.  No visible retractions or increased work of breathing.    SKIN: Visible skin clear. No significant rash, abnormal pigmentation or lesions.  NEURO: Cranial nerves grossly intact.  Mentation and speech appropriate for age.  PSYCH: Mentation appears normal, affect normal/bright, judgement and insight intact, normal speech and appearance well-groomed.      Diagnostic Test Results:  Labs reviewed in Epic          Video-Visit Details    Type of service:  Video Visit    Video End Time:Stop: 06/23/2020 02:17 pm    Originating Location (pt. Location): Home    Distant Location (provider location):  [facility]     Platform used for Video Visit: Couple    Assessment and Plan  1. Mixed hyperlipidemia  Uncontrolled, secondary to noncompliance of medications. Please see HPI for details. Will refill the medications and placed future labs of Lipid panel and LFTs check in 4 weeks.  - Given instructions to be compliant on medications given his uncontrolled hypertriglyceridemia and FH of CAD.  - atorvastatin (LIPITOR) 80 MG tablet; Take 1 tablet (80 mg) by mouth daily  Dispense: 90 tablet; Refill: 1  - ezetimibe (ZETIA) 10 MG tablet; Take 1 tablet (10 mg) by mouth daily  Dispense: 90 tablet; Refill: 1  - Lipid panel reflex to direct LDL Fasting; Future  - Comprehensive metabolic panel; Future    2. Encounter for screening for HIV  - HIV Antigen Antibody Combo; Future     Patient Instructions   Please do the lab work ordered. Medications sent to the pharmacy.     Please take the medications regularly and do " the labs after 4 weeks for improvement.    =============================    Patient Education     Cholesterol Medicines    Your healthcare provider has prescribed medicine to help control your cholesterol. This sheet tells you how cholesterol affects your health. It also explains how medicines can help improve your cholesterol levels.  Understanding cholesterol  Cholesterol is a type of fat (lipid) that s carried in the blood. Your body makes cholesterol in the liver. You also get it from certain foods. Your body needs some cholesterol to stay healthy. But high cholesterol makes more plaque build up in blood vessels. Plaque is a fatty substance. Over time, this plaque narrows and hardens the blood vessels. This reduces or blocks blood flow in these vessels and raises your risk for heart attack (acute myocardial infarction, or AMI), stroke, and other health problems. This is known as atherosclerotic cardiovascular disease (ASCVD).  Types of lipids  Your blood has 3 key fats:    LDL (low-density lipoprotein) cholesterol. This is called  bad  because it can cause plaque to build up in the blood vessels.    HDL (high-density lipoprotein) cholesterol. This is called  good  because it helps get rid of harmful cholesterol in the bloodstream.    Triglycerides. Your body uses this form of fat to store energy. Like LDL cholesterol, this fat can cause plaque to build up in the blood vessels.  Healthy cholesterol levels  You can find out your levels by having a blood test. High blood cholesterol is a risk factor for getting ASCVD. Talk to your healthcare provider about what levels are best for you. To find out more about cholesterol levels, visit www.heart.org/cholesterol.  You may need your cholesterol levels checked at regular intervals. This is to see if you are meeting the cholesterol goals you and your provider have agreed on. Make sure you know what this schedule will be and how to get ready for this test.  Types of  cholesterol medicines  Medicines can help control the amount of cholesterol in the blood. There are several types. Each controls cholesterol in a different way. They also have different effects in terms of how well they work to lower cholesterol and reduce death rates. Discuss these effects with your healthcare provider. Your healthcare provider will prescribe the type of medicine that is best for you. Medicines may be used alone or combined. The main types are:    Statins (HMG-CoA reductase inhibitors). Statins are thought to be the best at lowering cholesterol. They do this by keeping your body from making cholesterol. Benefits: Statins lower LDL cholesterol. They also slightly raise HDL cholesterol and lower triglycerides.    Selective cholesterol absorption inhibitors. These prevent your body from taking cholesterol from food. They may be prescribed to use alone or with a statin. Benefits: These medicines lower LDL cholesterol. They also slightly raise HDL cholesterol and lower triglycerides.    Resins (bile acid sequestrants or bile acid-binding medicines). Resins help you get rid of cholesterol through the intestines. They work by binding to bile. Bile is a substance that helps the body digest food. Your body uses cholesterol to make bile. Normally, most bile is absorbed by the body during digestion. But when bile is bound to resin, it is eliminated from the body. So the body must make more bile. To do this, the body takes up more cholesterol from the blood. Benefits: Resins lower LDL cholesterol.    Fibrates (fibric acid derivatives). These are best at cutting back on how much triglycerides your body makes. They don t work well to lower LDL. Benefits: Fibrates lower triglycerides. They also raise HDL cholesterol.    Niacin (nicotinic acid). Niacin (vitamin B-3) affects how the liver makes blood fats. But don t use over-the-counter niacin for cholesterol problems. It isn t regulated by the FDA. Benefits:  Niacin raises HDL cholesterol. It also lowers triglycerides and LDL cholesterol.    Omega-3 fatty acids. These reduce the amount of triglycerides your body makes. They also help to clear these lipids from the blood. Omega-3 fatty acids are found in many foods. These include salmon and other oily fish, and walnuts. Your healthcare provider may prescribe these fatty acids in capsule form. Benefits: Omega-3s lower triglycerides. (Note: They may increase LDL cholesterol in some patients.)     PCSK9 inhibitors. These medicines lower LDL cholesterol levels by breaking down the chemicals in the liver that control making LDL cholesterol. These medicines are given by an injection. They are used for people who have an inherited form of high cholesterol (familial hypercholesterolemia). They are also for people who have a hard time controlling their cholesterol with other medicines.  High-risk groups  Some people may need to take medicines called statins to control their cholesterol. This is in addition to eating a healthy diet and getting regular exercise.  Statins can help you stay healthy. They can also help prevent a heart attack or stroke. You may need to take a statin if you are in one of these groups:    Adults who have had a heart attack or stroke. Or adults who have had peripheral vascular disease, a ministroke (transient ischemic attack), or stable or unstable angina. This group also includes people who have had a procedure to restore blood flow through a blocked artery. These procedures include percutaneous coronary intervention, angioplasty, stent, and open-heart bypass surgery.    Adults who have diabetes. Or adults who are at higher risk of having a heart attack or stroke and have an LDL cholesterol level of 70 to 189 mg/dL    Adults who are 21 years old or older and have an LDL cholesterol level of 190 mg/dL or higher.  If you are in a high-risk group, talk with your healthcare provider about your treatment  goals. Make sure you understand why these goals are important, based on your own health history and your family history of heart disease or high cholesterol.  Make a plan to have regular cholesterol checks. You may need to fast before getting this test. Also ask your provider about any side effects your medicines may cause. Let your provider know about any side effects you have. You may need to take more than one medicine to reach the cholesterol goals that you and your provider decide on.  Taking cholesterol medicines  Take your medication exactly as your healthcare provider tells you to. This will help it work best. Here are tips for taking cholesterol medicines:    Know when and how to take your medicines. Some may need to be taken with food. Others may need to be taken on an empty stomach or at a certain time of day.    Stick to a schedule. Try the following:  ? Don t skip doses or stop taking your medicine. This is important even if you feel better or if your cholesterol numbers improve.  ? Set things up to help you remember. For instance, work taking your medicines into your routine. You could plan to take them when you get up in the morning or when you go to bed at night.  ? Keep track of what you take. You may take a few different medicines. If so, a list or chart can help you take the right pills at the right time. A pillbox with days of the week or times of day is also a good tool for keeping track.    Prevent medicine interactions. Some medicines and supplements can interact with one another. This means they affect how other medicines work when taken together. Be sure to tell your healthcare provider about all other medicines you take. This includes vitamins, herbs, and over-the-counter medicines.    Know how to deal with side effects. Many people have side effects when they first start taking a medicine. These are things like headache and stomach upset. Side effects should go away in a few weeks. Tell  your healthcare provider about any side effects you have. Certain side effects should be reported to your healthcare provider right away. These include yellowing of the eyes and blurred vision. Also report muscle aches and breathing problems.  If you are pregnant or breastfeeding, tell your health care provider before taking any cholesterol medicines.  A healthy lifestyle  Your healthcare provider will help you make changes your lifestyle if needed. These changes are needed to help you lower your cholesterol and keep the ASCVD from getting worse. Things you may need to work on are:  Diet  Your healthcare provider will give you information on changes that you may need to make to your diet. You may need to see a registered dietitian or nutritionist for help with these changes. You might be asked to:    Eat less meat containing saturated fat and high levels of cholesterol    Eat less sodium (salt), especially if you have high blood pressure    Eat more fresh vegetables and fruits    Eat lean protein, like fish, chicken and turkey, and beans and peas    Eat less processed meats, like deli meats, sausage, and pepperoni    Choose non-fat and low-fat milk, yogurt and cheese    Use vegetable and nut oils instead of butter, shortening, and margarine    Limit sweets and packaged foods, like chips, cookies, and baked goods    Limit eating out at restaurants and eating fast foods  Physical activity  Your healthcare provider may ask you to be more active. Depending on your health, your provider may recommend that you get moderate to vigorous intensity exercise for at least 40 minutes each day, 3 to 4 days each week. Some examples of moderate to vigorous exercise are:    Walking at a brisk pace, about 3-4 miles per hour    Jogging or running    Riding a bicycle or stationary bike    Swimming or water aerobics    Dancing    Hiking    Martial arts    Tennis  You may not be able to do 40 minutes right away. You may have to start  with 5 to 10 minutes a day, 2 times a day, and then keep adding to it until you can do 40 minutes.  Weight management  If you are overweight or obese, your healthcare provider may tell you to lose weight and lower your BMI (body mass index). Changing your diet and getting more exercise can help. Controlling the number of calories you eat is the best way to lose weight.  Smoking  If you smoke, quit now. Ask your health care provider for information on medications that can help you fight cravings. Enroll in a stop-smoking program to improve your chances of success. Ask your provider for smoking-cessation referrals and products.  Stress  Learn ways to help you deal with stress in your home and work life. Here are a few ideas:    Take a yoga class. You can find classes at local community centers or on the Internet.    Get regular exercise. Exercise helps your mind let go of problems and release stress in your muscles.    Deep breathing. Take a few minutes several times a day to just sit quietly and breathe. Concentrate on the air going into and out of your body.    Talk with loved ones. Take a few minutes each day to connect with people that make you feel supported.  Date Last Reviewed: 2/1/2017 2000-2019 [employer]. [address] [place] [address], [place] [address]. All rights reserved. This information is not intended as a substitute for professional medical care. Always follow your healthcare professional's instructions.                         Return in about 3 months (around 9/23/2020), or if symptoms worsen or fail to improve, for dyslipidemia.    Laine Laws MD  [facility]

## 2020-06-23 NOTE — ASSESSMENT & PLAN NOTE
This is a chronic health problem that is uncontrolled with current medications and lifestyle measures. Last lab work in 06/2019 abnormal. Currently on combination of Atorvastatin and Zetia. Risk factors of FH of premature CAD in father. Pt has been diagnosed with HLD since 5 yrs and Zetia added last year. He states that he was out of refills and not been on the medications for more than a month.

## 2020-07-28 DIAGNOSIS — E78.2 MIXED HYPERLIPIDEMIA: ICD-10-CM

## 2020-07-28 DIAGNOSIS — E78.1 HYPERTRIGLYCERIDEMIA: Primary | ICD-10-CM

## 2020-07-28 DIAGNOSIS — Z11.4 ENCOUNTER FOR SCREENING FOR HIV: ICD-10-CM

## 2020-07-28 LAB
ALBUMIN SERPL-MCNC: 4 G/DL (ref 3.4–5)
ALP SERPL-CCNC: 78 U/L (ref 40–150)
ALT SERPL W P-5'-P-CCNC: 49 U/L (ref 0–70)
ANION GAP SERPL CALCULATED.3IONS-SCNC: 7 MMOL/L (ref 3–14)
AST SERPL W P-5'-P-CCNC: 31 U/L (ref 0–45)
BILIRUB SERPL-MCNC: 0.8 MG/DL (ref 0.2–1.3)
BUN SERPL-MCNC: 13 MG/DL (ref 7–30)
CALCIUM SERPL-MCNC: 8.9 MG/DL (ref 8.5–10.1)
CHLORIDE SERPL-SCNC: 107 MMOL/L (ref 94–109)
CHOLEST SERPL-MCNC: 218 MG/DL
CO2 SERPL-SCNC: 23 MMOL/L (ref 20–32)
CREAT SERPL-MCNC: 1.11 MG/DL (ref 0.66–1.25)
GFR SERPL CREATININE-BSD FRML MDRD: 80 ML/MIN/{1.73_M2}
GLUCOSE SERPL-MCNC: 109 MG/DL (ref 70–99)
HDLC SERPL-MCNC: 40 MG/DL
LDLC SERPL CALC-MCNC: ABNORMAL MG/DL
LDLC SERPL DIRECT ASSAY-MCNC: 95 MG/DL
NONHDLC SERPL-MCNC: 178 MG/DL
POTASSIUM SERPL-SCNC: 3.9 MMOL/L (ref 3.4–5.3)
PROT SERPL-MCNC: 7.6 G/DL (ref 6.8–8.8)
SODIUM SERPL-SCNC: 137 MMOL/L (ref 133–144)
TRIGL SERPL-MCNC: 492 MG/DL

## 2020-07-28 PROCEDURE — 36415 COLL VENOUS BLD VENIPUNCTURE: CPT | Performed by: INTERNAL MEDICINE

## 2020-07-28 PROCEDURE — 80061 LIPID PANEL: CPT | Performed by: INTERNAL MEDICINE

## 2020-07-28 PROCEDURE — 87389 HIV-1 AG W/HIV-1&-2 AB AG IA: CPT | Performed by: INTERNAL MEDICINE

## 2020-07-28 PROCEDURE — 80053 COMPREHEN METABOLIC PANEL: CPT | Performed by: INTERNAL MEDICINE

## 2020-07-28 PROCEDURE — 83721 ASSAY OF BLOOD LIPOPROTEIN: CPT | Mod: 59 | Performed by: INTERNAL MEDICINE

## 2020-07-28 RX ORDER — FENOFIBRATE 160 MG/1
160 TABLET ORAL
Qty: 90 TABLET | Refills: 0 | Status: SHIPPED | OUTPATIENT
Start: 2020-07-28 | End: 2023-05-30

## 2020-07-29 LAB — HIV 1+2 AB+HIV1 P24 AG SERPL QL IA: NONREACTIVE

## 2021-01-07 DIAGNOSIS — E78.2 MIXED HYPERLIPIDEMIA: ICD-10-CM

## 2021-01-07 RX ORDER — EZETIMIBE 10 MG/1
10 TABLET ORAL DAILY
Qty: 90 TABLET | Refills: 1 | Status: SHIPPED | OUTPATIENT
Start: 2021-01-07 | End: 2021-11-26

## 2021-01-07 RX ORDER — ATORVASTATIN CALCIUM 80 MG/1
80 TABLET, FILM COATED ORAL DAILY
Qty: 90 TABLET | Refills: 1 | Status: SHIPPED | OUTPATIENT
Start: 2021-01-07 | End: 2021-11-26

## 2021-01-07 NOTE — TELEPHONE ENCOUNTER
"Failed protocol.  Requested Prescriptions   Pending Prescriptions Disp Refills     atorvastatin (LIPITOR) 80 MG tablet 90 tablet 1     Sig: Take 1 tablet (80 mg) by mouth daily       Statins Protocol Passed - 1/7/2021  9:51 AM        Passed - LDL on file in past 12 months     Recent Labs   Lab Test 07/28/20  0830   LDL Cannot estimate LDL when triglyceride exceeds 400 mg/dL  95             Passed - No abnormal creatine kinase in past 12 months     No lab results found.             Passed - Recent (12 mo) or future (30 days) visit within the authorizing provider's specialty     Patient has had an office visit with the authorizing provider or a provider within the authorizing providers department within the previous 12 mos or has a future within next 30 days. See \"Patient Info\" tab in inbasket, or \"Choose Columns\" in Meds & Orders section of the refill encounter.              Passed - Medication is active on med list        Passed - Patient is age 18 or older           ezetimibe (ZETIA) 10 MG tablet 90 tablet 1     Sig: Take 1 tablet (10 mg) by mouth daily       Antihyperlipidemic agents Passed - 1/7/2021  9:51 AM        Passed - Lipid panel on file in past 12 mos     Recent Labs   Lab Test 07/28/20  0830 04/25/14  1039 04/25/14  1039   CHOL 218*   < > 179   TRIG 492*   < > 164*   HDL 40   < > 40*   LDL Cannot estimate LDL when triglyceride exceeds 400 mg/dL  95   < > 106   NHDL 178*   < >  --    VLDL  --   --  33*   CHOLHDLRATIO  --   --  4.5    < > = values in this interval not displayed.               Passed - Normal serum ALT on record in past 12 mos     Recent Labs   Lab Test 07/28/20  0830   ALT 49             Passed - Recent (12 mo) or future (30 days) visit within the authorizing provider's specialty     Patient has had an office visit with the authorizing provider or a provider within the authorizing providers department within the previous 12 mos or has a future within next 30 days. See \"Patient Info\" tab " "in inbasket, or \"Choose Columns\" in Meds & Orders section of the refill encounter.              Passed - Medication is active on med list        Passed - Patient is age 18 years or older             "

## 2021-01-10 ENCOUNTER — HEALTH MAINTENANCE LETTER (OUTPATIENT)
Age: 46
End: 2021-01-10

## 2021-07-30 DIAGNOSIS — E78.2 MIXED HYPERLIPIDEMIA: ICD-10-CM

## 2021-07-30 RX ORDER — EZETIMIBE 10 MG/1
10 TABLET ORAL DAILY
Qty: 90 TABLET | Refills: 1 | OUTPATIENT
Start: 2021-07-30

## 2021-07-30 RX ORDER — ATORVASTATIN CALCIUM 80 MG/1
80 TABLET, FILM COATED ORAL DAILY
Qty: 90 TABLET | Refills: 1 | OUTPATIENT
Start: 2021-07-30

## 2021-07-30 NOTE — TELEPHONE ENCOUNTER
Routing refill request to provider for review/approval because:  Labs not current:    LDL Cholesterol Calculated   Date Value Ref Range Status   07/28/2020  <100 mg/dL Final    Cannot estimate LDL when triglyceride exceeds 400 mg/dL     LDL Cholesterol Direct   Date Value Ref Range Status   07/28/2020 95 <100 mg/dL Final     Comment:     Desirable:       <100 mg/dl     Lab Results   Component Value Date    ALT 49 07/28/2020       Patient needs to be seen because:  DM follow up.   Salome Hernandez RN

## 2021-07-31 NOTE — TELEPHONE ENCOUNTER
Please schedule follow up appointment for further refills.     Thank you,  Laine Laws MD on 7/30/2021 at 9:04 PM

## 2021-08-02 NOTE — TELEPHONE ENCOUNTER
Patient Contact    Attempt # 1    Was call answered?  No.  Left message on voicemail with information to call triage back.    On call back:     - help schedule follow-up appointment

## 2021-08-06 NOTE — TELEPHONE ENCOUNTER
Since Bensussen Deutsch message was not sent, I sent a Bensussen Deutsch message to the patient letting him know that he is due for physical with Dr. Laws.    Surekha Moss on 8/6/2021 at 12:11 PM

## 2021-10-23 ENCOUNTER — HEALTH MAINTENANCE LETTER (OUTPATIENT)
Age: 46
End: 2021-10-23

## 2021-11-26 ENCOUNTER — OFFICE VISIT (OUTPATIENT)
Dept: FAMILY MEDICINE | Facility: CLINIC | Age: 46
End: 2021-11-26
Payer: COMMERCIAL

## 2021-11-26 VITALS
BODY MASS INDEX: 28.58 KG/M2 | SYSTOLIC BLOOD PRESSURE: 110 MMHG | OXYGEN SATURATION: 95 % | DIASTOLIC BLOOD PRESSURE: 70 MMHG | RESPIRATION RATE: 16 BRPM | TEMPERATURE: 97 F | WEIGHT: 193 LBS | HEIGHT: 69 IN | HEART RATE: 80 BPM

## 2021-11-26 DIAGNOSIS — E78.2 MIXED HYPERLIPIDEMIA: ICD-10-CM

## 2021-11-26 DIAGNOSIS — Z13.220 SCREENING FOR HYPERLIPIDEMIA: ICD-10-CM

## 2021-11-26 DIAGNOSIS — Z00.00 ROUTINE GENERAL MEDICAL EXAMINATION AT A HEALTH CARE FACILITY: Primary | ICD-10-CM

## 2021-11-26 LAB
ALBUMIN SERPL-MCNC: 3.8 G/DL (ref 3.4–5)
ALP SERPL-CCNC: 93 U/L (ref 40–150)
ALT SERPL W P-5'-P-CCNC: 55 U/L (ref 0–70)
ANION GAP SERPL CALCULATED.3IONS-SCNC: 6 MMOL/L (ref 3–14)
AST SERPL W P-5'-P-CCNC: 33 U/L (ref 0–45)
BILIRUB SERPL-MCNC: 0.4 MG/DL (ref 0.2–1.3)
BUN SERPL-MCNC: 12 MG/DL (ref 7–30)
CALCIUM SERPL-MCNC: 8.8 MG/DL (ref 8.5–10.1)
CHLORIDE BLD-SCNC: 107 MMOL/L (ref 94–109)
CHOLEST SERPL-MCNC: 320 MG/DL
CO2 SERPL-SCNC: 25 MMOL/L (ref 20–32)
CREAT SERPL-MCNC: 1.23 MG/DL (ref 0.66–1.25)
ERYTHROCYTE [DISTWIDTH] IN BLOOD BY AUTOMATED COUNT: 12.3 % (ref 10–15)
FASTING STATUS PATIENT QL REPORTED: YES
GFR SERPL CREATININE-BSD FRML MDRD: 70 ML/MIN/1.73M2
GLUCOSE BLD-MCNC: 118 MG/DL (ref 70–99)
HCT VFR BLD AUTO: 45.3 % (ref 40–53)
HDLC SERPL-MCNC: 36 MG/DL
HGB BLD-MCNC: 15.6 G/DL (ref 13.3–17.7)
LDLC SERPL CALC-MCNC: 177 MG/DL
LDLC SERPL CALC-MCNC: ABNORMAL MG/DL
MCH RBC QN AUTO: 30.1 PG (ref 26.5–33)
MCHC RBC AUTO-ENTMCNC: 34.4 G/DL (ref 31.5–36.5)
MCV RBC AUTO: 87 FL (ref 78–100)
NONHDLC SERPL-MCNC: 284 MG/DL
PLATELET # BLD AUTO: 211 10E3/UL (ref 150–450)
POTASSIUM BLD-SCNC: 4.3 MMOL/L (ref 3.4–5.3)
PROT SERPL-MCNC: 7.1 G/DL (ref 6.8–8.8)
RBC # BLD AUTO: 5.19 10E6/UL (ref 4.4–5.9)
SODIUM SERPL-SCNC: 138 MMOL/L (ref 133–144)
TRIGL SERPL-MCNC: 697 MG/DL
WBC # BLD AUTO: 6.4 10E3/UL (ref 4–11)

## 2021-11-26 PROCEDURE — 80061 LIPID PANEL: CPT | Performed by: FAMILY MEDICINE

## 2021-11-26 PROCEDURE — 90686 IIV4 VACC NO PRSV 0.5 ML IM: CPT | Performed by: FAMILY MEDICINE

## 2021-11-26 PROCEDURE — 85027 COMPLETE CBC AUTOMATED: CPT | Performed by: FAMILY MEDICINE

## 2021-11-26 PROCEDURE — 90471 IMMUNIZATION ADMIN: CPT | Performed by: FAMILY MEDICINE

## 2021-11-26 PROCEDURE — 83721 ASSAY OF BLOOD LIPOPROTEIN: CPT | Mod: 59 | Performed by: FAMILY MEDICINE

## 2021-11-26 PROCEDURE — 80053 COMPREHEN METABOLIC PANEL: CPT | Performed by: FAMILY MEDICINE

## 2021-11-26 PROCEDURE — 36415 COLL VENOUS BLD VENIPUNCTURE: CPT | Performed by: FAMILY MEDICINE

## 2021-11-26 PROCEDURE — 99396 PREV VISIT EST AGE 40-64: CPT | Mod: 25 | Performed by: FAMILY MEDICINE

## 2021-11-26 RX ORDER — ATORVASTATIN CALCIUM 80 MG/1
80 TABLET, FILM COATED ORAL DAILY
Qty: 90 TABLET | Refills: 1 | Status: SHIPPED | OUTPATIENT
Start: 2021-11-26 | End: 2022-05-24

## 2021-11-26 RX ORDER — EZETIMIBE 10 MG/1
10 TABLET ORAL DAILY
Qty: 90 TABLET | Refills: 1 | Status: SHIPPED | OUTPATIENT
Start: 2021-11-26 | End: 2022-05-24

## 2021-11-26 ASSESSMENT — ENCOUNTER SYMPTOMS
COUGH: 0
PARESTHESIAS: 0
WEAKNESS: 0
PALPITATIONS: 0
HEADACHES: 0
ABDOMINAL PAIN: 0
EYE PAIN: 0
HEARTBURN: 0
MYALGIAS: 0
SORE THROAT: 0
CONSTIPATION: 0
NAUSEA: 0
JOINT SWELLING: 0
FEVER: 0
DYSURIA: 0
ARTHRALGIAS: 0
HEMATOCHEZIA: 0
DIARRHEA: 0
SHORTNESS OF BREATH: 0
FREQUENCY: 0
DIZZINESS: 0
NERVOUS/ANXIOUS: 0
HEMATURIA: 0
CHILLS: 0

## 2021-11-26 ASSESSMENT — MIFFLIN-ST. JEOR: SCORE: 1745.82

## 2021-11-26 ASSESSMENT — PAIN SCALES - GENERAL: PAINLEVEL: NO PAIN (0)

## 2021-11-26 NOTE — PROGRESS NOTES
SUBJECTIVE:   CC: KENAN CAGLE is an 46 year old male who presents for preventative health visit.       Patient has been advised of split billing requirements and indicates understanding: Yes  Healthy Habits:     Getting at least 3 servings of Calcium per day:  Yes    Bi-annual eye exam:  NO    Dental care twice a year:  Yes    Sleep apnea or symptoms of sleep apnea:  None    Diet:  Regular (no restrictions)    Frequency of exercise:  2-3 days/week    Duration of exercise:  15-30 minutes    Taking medications regularly:  Yes    Barriers to taking medications:  None    Medication side effects:  None    PHQ-2 Total Score: 0    Additional concerns today:  No              Today's PHQ-2 Score:   PHQ-2 ( 1999 Pfizer) 11/26/2021   Q1: Little interest or pleasure in doing things 0   Q2: Feeling down, depressed or hopeless 0   PHQ-2 Score 0   PHQ-2 Total Score (12-17 Years)- Positive if 3 or more points; Administer PHQ-A if positive -   Q1: Little interest or pleasure in doing things Not at all   Q2: Feeling down, depressed or hopeless Not at all   PHQ-2 Score 0       Abuse: Current or Past(Physical, Sexual or Emotional)- No  Do you feel safe in your environment? Yes    Have you ever done Advance Care Planning? (For example, a Health Directive, POLST, or a discussion with a medical provider or your loved ones about your wishes): No, advance care planning information given to patient to review.  Patient declined advance care planning discussion at this time.    Social History     Tobacco Use     Smoking status: Never Smoker     Smokeless tobacco: Never Used   Substance Use Topics     Alcohol use: Yes     Alcohol/week: 3.3 standard drinks     Comment: once weekly, maybe two drinks with dinner     If you drink alcohol do you typically have >3 drinks per day or >7 drinks per week? No    Alcohol Use 11/26/2021   Prescreen: >3 drinks/day or >7 drinks/week? No   Prescreen: >3 drinks/day or >7 drinks/week? -       Last PSA:   PSA    Date Value Ref Range Status   03/08/2019 0.73 0 - 4 ug/L Final     Comment:     Assay Method:  Chemiluminescence using Siemens Vista analyzer       Reviewed orders with patient. Reviewed health maintenance and updated orders accordingly - Yes  BP Readings from Last 3 Encounters:   11/26/21 110/70   03/08/19 120/70   03/05/15 110/68    Wt Readings from Last 3 Encounters:   11/26/21 87.5 kg (193 lb)   03/08/19 82.6 kg (182 lb)   03/05/15 83.9 kg (185 lb)                  Patient Active Problem List   Diagnosis     Mixed hyperlipidemia     Impaired fasting glucose     Screening for prostate cancer     Family history of ischemic heart disease: fr w MI 58y/o     Family history of diabetes mellitus: mom at 60      Past Surgical History:   Procedure Laterality Date     HC TOOTH EXTRACTION W/FORCEP      [place] teeth, also bridge, root canal work       Social History     Tobacco Use     Smoking status: Never Smoker     Smokeless tobacco: Never Used   Substance Use Topics     Alcohol use: Yes     Alcohol/week: 3.3 standard drinks     Comment: once weekly, maybe two drinks with dinner     Family History   Problem Relation Age of Onset     Diabetes Mother         Born 1948.     C.A.D. Father         Born 1947. Had MI at about age 54.     Lipids Father      C.A.D. Maternal Grandfather      Cerebrovascular Disease Paternal Grandfather         GGFa     Cerebrovascular Disease Sister         Born 1981. Had slight stroke thought related to smoking/OCP's     Family History Negative Brother         Born 1978     Family History Negative Brother         Born 1983     Cancer - colorectal No family hx of      Prostate Cancer No family hx of          Current Outpatient Medications   Medication Sig Dispense Refill     aspirin 81 MG tablet Take 1 tablet by mouth daily. 100 tablet 3     atorvastatin (LIPITOR) 80 MG tablet Take 1 tablet (80 mg) by mouth daily 90 tablet 1     ezetimibe (ZETIA) 10 MG tablet Take 1 tablet (10 mg) by mouth daily  90 tablet 1     MULTI-VITAMIN OR TABS occasionally       fenofibrate (TRIGLIDE/LOFIBRA) 160 MG tablet Take 1 tablet (160 mg) by mouth daily with food Check LFTs, CK, Cr (must be <2) before starting 90 tablet 0     Allergies   Allergen Reactions     Nkda [No Known Drug Allergies]      Recent Labs   Lab Test 07/28/20  0830 06/05/19  1016 03/08/19  1022 04/25/14  1039 12/20/13  0848   LDL Cannot estimate LDL when triglyceride exceeds 400 mg/dL  95 121* 214*   < > 177*   HDL 40 47 42   < > 42   TRIG 492* 327* 235*   < > 216*   ALT 49 58 46   < > 43   CR 1.11  --   --   --  1.35*   GFRESTIMATED 80  --   --   --  59*   GFRESTBLACK >90  --   --   --  72   POTASSIUM 3.9  --   --   --  4.2   TSH  --   --   --   --  1.33    < > = values in this interval not displayed.        Reviewed and updated as needed this visit by clinical staff                Reviewed and updated as needed this visit by Provider               Past Medical History:   Diagnosis Date     Allergic rhinitis, cause unspecified     Very mild spring and fall allergies, OTC antihistamines prn     Other and unspecified hyperlipidemia       Past Surgical History:   Procedure Laterality Date     HC TOOTH EXTRACTION W/FORCEP      [place] teeth, also bridge, root canal work       Review of Systems   Constitutional: Negative for chills and fever.   HENT: Negative for congestion, ear pain, hearing loss and sore throat.    Eyes: Negative for pain and visual disturbance.   Respiratory: Negative for cough and shortness of breath.    Cardiovascular: Negative for chest pain, palpitations and peripheral edema.   Gastrointestinal: Negative for abdominal pain, constipation, diarrhea, heartburn, hematochezia and nausea.   Genitourinary: Negative for dysuria, frequency, genital sores, hematuria, impotence, penile discharge and urgency.   Musculoskeletal: Negative for arthralgias, joint swelling and myalgias.   Skin: Negative for rash.   Neurological: Negative for dizziness,  "weakness, headaches and paresthesias.   Psychiatric/Behavioral: Negative for mood changes. The patient is not nervous/anxious.          OBJECTIVE:   /70   Pulse 80   Temp 97  F (36.1  C)   Resp 16   Ht 1.753 m (5' 9\")   Wt 87.5 kg (193 lb)   SpO2 95%   BMI 28.50 kg/m      Physical Exam  GENERAL: healthy, alert and no distress  EYES: Eyes grossly normal to inspection, PERRL and conjunctivae and sclerae normal  HENT: ear canals and TM's normal, nose and mouth without ulcers or lesions  NECK: no adenopathy, no asymmetry, masses, or scars and thyroid normal to palpation  RESP: lungs clear to auscultation - no rales, rhonchi or wheezes  CV: regular rate and rhythm, normal S1 S2, no S3 or S4, no murmur, click or rub, no peripheral edema and peripheral pulses strong  ABDOMEN: soft, nontender, no hepatosplenomegaly, no masses and bowel sounds normal  MS: no gross musculoskeletal defects noted, no edema  SKIN: no suspicious lesions or rashes  NEURO: Normal strength and tone, mentation intact and speech normal  BACK: no CVA tenderness, no paralumbar tenderness  PSYCH: mentation appears normal, affect normal/bright  LYMPH: no cervical, supraclavicular, axillary, or inguinal adenopathy        ASSESSMENT/PLAN:       ICD-10-CM    1. Routine general medical examination at a health care facility  Z00.00 INFLUENZA VACCINE IM > 6 MONTHS VALENT IIV4 (AFLURIA/FLUZONE)     REVIEW OF HEALTH MAINTENANCE PROTOCOL ORDERS     Lipid panel reflex to direct LDL Fasting     CBC with platelets     Comprehensive metabolic panel (BMP + Alb, Alk Phos, ALT, AST, Total. Bili, TP)   2. Screening for hyperlipidemia  Z13.220    3. Mixed hyperlipidemia  E78.2 atorvastatin (LIPITOR) 80 MG tablet     ezetimibe (ZETIA) 10 MG tablet       Patient has been advised of split billing requirements and indicates understanding: Yes  COUNSELING:   Reviewed preventive health counseling, as reflected in patient instructions    Estimated body mass index is " "27.27 kg/m  as calculated from the following:    Height as of 3/8/19: 1.74 m (5' 8.5\").    Weight as of 3/8/19: 82.6 kg (182 lb).     Weight management plan: Discussed healthy diet and exercise guidelines    He reports that he has never smoked. He has never used smokeless tobacco.      Counseling Resources:  ATP IV Guidelines  Pooled Cohorts Equation Calculator  FRAX Risk Assessment  ICSI Preventive Guidelines  Dietary Guidelines for Americans, 2010  USDA's MyPlate  ASA Prophylaxis  Lung CA Screening    Sammy Bustamante MD  [facility]  "

## 2022-10-09 ENCOUNTER — HEALTH MAINTENANCE LETTER (OUTPATIENT)
Age: 47
End: 2022-10-09

## 2023-02-18 ENCOUNTER — HEALTH MAINTENANCE LETTER (OUTPATIENT)
Age: 48
End: 2023-02-18

## 2023-04-07 DIAGNOSIS — E78.2 MIXED HYPERLIPIDEMIA: ICD-10-CM

## 2023-04-07 RX ORDER — EZETIMIBE 10 MG/1
TABLET ORAL
Qty: 30 TABLET | Refills: 1 | Status: SHIPPED | OUTPATIENT
Start: 2023-04-07 | End: 2023-04-24

## 2023-04-07 RX ORDER — ATORVASTATIN CALCIUM 80 MG/1
TABLET, FILM COATED ORAL
Qty: 30 TABLET | Refills: 1 | Status: SHIPPED | OUTPATIENT
Start: 2023-04-07 | End: 2023-04-24

## 2023-04-07 NOTE — TELEPHONE ENCOUNTER
Upon chart review, patient is scheduled for an appointment next month.    Appointments in Next Year    May 11, 2023 11:30 AM  (Arrive by 11:10 AM)  Adult Preventative Visit with Sammy Bustamante MD  [facility] ([facility] ) [phone]        Last refill request sent 3/6/23 for 30 tablets with one additional refill on file. Patient may run out of medication prior to the scheduled appointment. Will route to Dr. Bustamante for review.     Linda Hobson RN

## 2023-04-22 DIAGNOSIS — E78.2 MIXED HYPERLIPIDEMIA: ICD-10-CM

## 2023-04-24 RX ORDER — ATORVASTATIN CALCIUM 80 MG/1
TABLET, FILM COATED ORAL
Qty: 90 TABLET | Refills: 0 | Status: SHIPPED | OUTPATIENT
Start: 2023-04-24 | End: 2023-05-30

## 2023-04-24 RX ORDER — EZETIMIBE 10 MG/1
TABLET ORAL
Qty: 90 TABLET | Refills: 0 | Status: SHIPPED | OUTPATIENT
Start: 2023-04-24 | End: 2023-05-30

## 2023-04-24 NOTE — TELEPHONE ENCOUNTER
Covering for Bustamante  Upcoming appointment with PCP 5/11  90 day refills sent as requested.  Trisha Barrera PA-C on 4/24/2023 at 1:19 PM

## 2023-05-30 ENCOUNTER — OFFICE VISIT (OUTPATIENT)
Dept: FAMILY MEDICINE | Facility: CLINIC | Age: 48
End: 2023-05-30
Payer: COMMERCIAL

## 2023-05-30 VITALS
RESPIRATION RATE: 15 BRPM | DIASTOLIC BLOOD PRESSURE: 73 MMHG | HEART RATE: 57 BPM | WEIGHT: 194 LBS | TEMPERATURE: 97.6 F | SYSTOLIC BLOOD PRESSURE: 113 MMHG | HEIGHT: 68 IN | BODY MASS INDEX: 29.4 KG/M2 | OXYGEN SATURATION: 94 %

## 2023-05-30 DIAGNOSIS — Z00.00 ENCOUNTER FOR ROUTINE ADULT HEALTH EXAMINATION WITHOUT ABNORMAL FINDINGS: Primary | ICD-10-CM

## 2023-05-30 DIAGNOSIS — E78.1 HYPERTRIGLYCERIDEMIA: ICD-10-CM

## 2023-05-30 DIAGNOSIS — E78.2 MIXED HYPERLIPIDEMIA: ICD-10-CM

## 2023-05-30 DIAGNOSIS — Z83.3 FAMILY HISTORY OF DIABETES MELLITUS: ICD-10-CM

## 2023-05-30 LAB — HBA1C MFR BLD: 5.9 % (ref 0–5.6)

## 2023-05-30 PROCEDURE — 83036 HEMOGLOBIN GLYCOSYLATED A1C: CPT | Performed by: PHYSICIAN ASSISTANT

## 2023-05-30 PROCEDURE — 80053 COMPREHEN METABOLIC PANEL: CPT | Performed by: PHYSICIAN ASSISTANT

## 2023-05-30 PROCEDURE — 2894A VOIDCORRECT: CPT | Mod: 25 | Performed by: PHYSICIAN ASSISTANT

## 2023-05-30 PROCEDURE — 36415 COLL VENOUS BLD VENIPUNCTURE: CPT | Performed by: PHYSICIAN ASSISTANT

## 2023-05-30 PROCEDURE — 99396 PREV VISIT EST AGE 40-64: CPT | Performed by: PHYSICIAN ASSISTANT

## 2023-05-30 PROCEDURE — 80061 LIPID PANEL: CPT | Performed by: PHYSICIAN ASSISTANT

## 2023-05-30 RX ORDER — FENOFIBRATE 160 MG/1
160 TABLET ORAL
Qty: 90 TABLET | Refills: 0 | Status: CANCELLED | OUTPATIENT
Start: 2023-05-30

## 2023-05-30 RX ORDER — EZETIMIBE 10 MG/1
10 TABLET ORAL DAILY
Qty: 90 TABLET | Refills: 3 | Status: SHIPPED | OUTPATIENT
Start: 2023-05-30

## 2023-05-30 RX ORDER — ATORVASTATIN CALCIUM 80 MG/1
80 TABLET, FILM COATED ORAL DAILY
Qty: 90 TABLET | Refills: 3 | Status: SHIPPED | OUTPATIENT
Start: 2023-05-30

## 2023-05-30 ASSESSMENT — ENCOUNTER SYMPTOMS
CONSTIPATION: 0
HEMATOCHEZIA: 0
FEVER: 0
HEMATURIA: 0
HEADACHES: 0
DIARRHEA: 0
MYALGIAS: 0
FREQUENCY: 0
DYSURIA: 0
NAUSEA: 0
JOINT SWELLING: 0
PARESTHESIAS: 0
PALPITATIONS: 0
ARTHRALGIAS: 0
EYE PAIN: 0
HEARTBURN: 0
ABDOMINAL PAIN: 0
SHORTNESS OF BREATH: 0
CHILLS: 0
COUGH: 0
SORE THROAT: 0
DIZZINESS: 0
NERVOUS/ANXIOUS: 0
WEAKNESS: 0

## 2023-05-30 ASSESSMENT — PAIN SCALES - GENERAL: PAINLEVEL: NO PAIN (0)

## 2023-05-30 NOTE — PROGRESS NOTES
SUBJECTIVE:   CC: Ricardo is an 47 year old who presents for preventative health visit.       5/30/2023     3:47 PM   Additional Questions   Roomed by Lalitha JORDAN   Patient has been advised of split billing requirements and indicates understanding: Yes  Healthy Habits:     Getting at least 3 servings of Calcium per day:  Yes    Bi-annual eye exam:  NO    Dental care twice a year:  Yes    Sleep apnea or symptoms of sleep apnea:  None    Diet:  Regular (no restrictions)    Frequency of exercise:  2-3 days/week    Duration of exercise:  15-30 minutes    Taking medications regularly:  Yes    Medication side effects:  None    PHQ-2 Total Score: 0    Additional concerns today:  No          Today's PHQ-2 Score:       5/30/2023     3:41 PM   PHQ-2 ( 1999 Pfizer)   Q1: Little interest or pleasure in doing things 0   Q2: Feeling down, depressed or hopeless 0   PHQ-2 Score 0   Q1: Little interest or pleasure in doing things Not at all   Q2: Feeling down, depressed or hopeless Not at all   PHQ-2 Score 0           Social History     Tobacco Use     Smoking status: Never     Smokeless tobacco: Never   Substance Use Topics     Alcohol use: Yes     Alcohol/week: 3.3 standard drinks of alcohol     Comment: once weekly, maybe two drinks with dinner             5/30/2023     3:41 PM   Alcohol Use   Prescreen: >3 drinks/day or >7 drinks/week? Yes   AUDIT SCORE  6       Last PSA:   PSA   Date Value Ref Range Status   03/08/2019 0.73 0 - 4 ug/L Final     Comment:     Assay Method:  Chemiluminescence using Siemens Vista analyzer       Reviewed orders with patient. Reviewed health maintenance and updated orders accordingly - Yes  Patient Active Problem List   Diagnosis     Mixed hyperlipidemia     Impaired fasting glucose     Screening for prostate cancer     Family history of ischemic heart disease: fr w MI 58y/o     Family history of diabetes mellitus: mom at 60      Past Surgical History:   Procedure Laterality Date     HC TOOTH EXTRACTION  W/FORCEP      [place] teeth, also bridge, root canal work       Social History     Tobacco Use     Smoking status: Never     Smokeless tobacco: Never   Substance Use Topics     Alcohol use: Yes     Alcohol/week: 3.3 standard drinks of alcohol     Comment: once weekly, maybe two drinks with dinner     Family History   Problem Relation Age of Onset     Diabetes Mother         Born 1948.     C.A.D. Father         Born 1947. Had MI at about age 54.     Lipids Father      C.A.D. Maternal Grandfather      Cerebrovascular Disease Paternal Grandfather         GGFa     Cerebrovascular Disease Sister         Born 1981. Had slight stroke thought related to smoking/OCP's     Family History Negative Brother         Born 1978     Family History Negative Brother         Born 1983     Cancer - colorectal No family hx of      Prostate Cancer No family hx of          Current Outpatient Medications   Medication Sig Dispense Refill     atorvastatin (LIPITOR) 80 MG tablet Take 1 tablet (80 mg) by mouth daily 90 tablet 3     ezetimibe (ZETIA) 10 MG tablet Take 1 tablet (10 mg) by mouth daily 90 tablet 3     aspirin 81 MG tablet Take 1 tablet by mouth daily. (Patient not taking: Reported on 5/30/2023) 100 tablet 3     MULTI-VITAMIN OR TABS occasionally (Patient not taking: Reported on 5/30/2023)       Allergies   Allergen Reactions     Nkda [No Known Drug Allergy]        Reviewed and updated as needed this visit by clinical staff   Tobacco  Allergies  Meds              Reviewed and updated as needed this visit by Provider                     Review of Systems   Constitutional: Negative for chills and fever.   HENT: Negative for congestion, ear pain, hearing loss and sore throat.    Eyes: Negative for pain and visual disturbance.   Respiratory: Negative for cough and shortness of breath.    Cardiovascular: Negative for chest pain, palpitations and peripheral edema.   Gastrointestinal: Negative for abdominal pain, constipation, diarrhea,  "heartburn, hematochezia and nausea.   Genitourinary: Negative for dysuria, frequency, genital sores, hematuria, impotence, penile discharge and urgency.   Musculoskeletal: Negative for arthralgias, joint swelling and myalgias.   Skin: Negative for rash.   Neurological: Negative for dizziness, weakness, headaches and paresthesias.   Psychiatric/Behavioral: Negative for mood changes. The patient is not nervous/anxious.          OBJECTIVE:   /73   Pulse 57   Temp 97.6  F (36.4  C) (Tympanic)   Resp 15   Ht 1.729 m (5' 8.07\")   Wt 88 kg (194 lb)   SpO2 94%   BMI 29.44 kg/m      Physical Exam  Constitutional:       General: He is not in acute distress.     Appearance: He is well-developed. He is not diaphoretic.   HENT:      Head: Normocephalic.      Right Ear: External ear normal.      Left Ear: External ear normal.      Nose: Nose normal.   Eyes:      Conjunctiva/sclera: Conjunctivae normal.   Cardiovascular:      Rate and Rhythm: Normal rate and regular rhythm.      Heart sounds: Normal heart sounds.   Pulmonary:      Effort: Pulmonary effort is normal.      Breath sounds: Normal breath sounds.   Musculoskeletal:      Cervical back: Normal range of motion.   Skin:     General: Skin is warm and dry.   Neurological:      Mental Status: He is alert and oriented to person, place, and time.   Psychiatric:         Judgment: Judgment normal.           Diagnostic Test Results:  Results for orders placed or performed in visit on 05/30/23   Lipid panel reflex to direct LDL Non-fasting     Status: Abnormal   Result Value Ref Range    Cholesterol 172 <200 mg/dL    Triglycerides 294 (H) <150 mg/dL    Direct Measure HDL 43 >=40 mg/dL    LDL Cholesterol Calculated 70 <=100 mg/dL    Non HDL Cholesterol 129 <130 mg/dL    Narrative    Cholesterol  Desirable:  <200 mg/dL    Triglycerides  Normal:  Less than 150 mg/dL  Borderline High:  150-199 mg/dL  High:  200-499 mg/dL  Very High:  Greater than or equal to 500 " mg/dL    Direct Measure HDL  Female:  Greater than or equal to 50 mg/dL   Male:  Greater than or equal to 40 mg/dL    LDL Cholesterol  Desirable:  <100mg/dL  Above Desirable:  100-129 mg/dL   Borderline High:  130-159 mg/dL   High:  160-189 mg/dL   Very High:  >= 190 mg/dL    Non HDL Cholesterol  Desirable:  130 mg/dL  Above Desirable:  130-159 mg/dL  Borderline High:  160-189 mg/dL  High:  190-219 mg/dL  Very High:  Greater than or equal to 220 mg/dL   Comprehensive metabolic panel (BMP + Alb, Alk Phos, ALT, AST, Total. Bili, TP)     Status: Abnormal   Result Value Ref Range    Sodium 140 136 - 145 mmol/L    Potassium 4.4 3.4 - 5.3 mmol/L    Chloride 105 98 - 107 mmol/L    Carbon Dioxide (CO2) 24 22 - 29 mmol/L    Anion Gap 11 7 - 15 mmol/L    Urea Nitrogen 15.8 6.0 - 20.0 mg/dL    Creatinine 1.39 (H) 0.67 - 1.17 mg/dL    Calcium 9.6 8.6 - 10.0 mg/dL    Glucose 102 (H) 70 - 99 mg/dL    Alkaline Phosphatase 70 40 - 129 U/L    AST 32 10 - 50 U/L    ALT 39 10 - 50 U/L    Protein Total 7.1 6.4 - 8.3 g/dL    Albumin 4.9 3.5 - 5.2 g/dL    Bilirubin Total 0.6 <=1.2 mg/dL    GFR Estimate 63 >60 mL/min/1.73m2   Hemoglobin A1c     Status: Abnormal   Result Value Ref Range    Hemoglobin A1C 5.9 (H) 0.0 - 5.6 %       ASSESSMENT/PLAN:   KENAN was seen today for physical.    Diagnoses and all orders for this visit:    Encounter for routine adult health examination without abnormal findings    Mixed hyperlipidemia  -     Lipid panel reflex to direct LDL Non-fasting; Future  -     atorvastatin (LIPITOR) 80 MG tablet; Take 1 tablet (80 mg) by mouth daily  -     ezetimibe (ZETIA) 10 MG tablet; Take 1 tablet (10 mg) by mouth daily  -     Comprehensive metabolic panel (BMP + Alb, Alk Phos, ALT, AST, Total. Bili, TP); Future  -     Lipid panel reflex to direct LDL Non-fasting  -     Comprehensive metabolic panel (BMP + Alb, Alk Phos, ALT, AST, Total. Bili, TP)    Hypertriglyceridemia    Family history of diabetes mellitus  -      Hemoglobin A1c; Future  -     Hemoglobin A1c              COUNSELING:   Reviewed preventive health counseling, as reflected in patient instructions        He reports that he has never smoked. He has never used smokeless tobacco.            Zhanna Olmos PA-C  [facility]

## 2023-05-31 LAB
ALBUMIN SERPL BCG-MCNC: 4.9 G/DL (ref 3.5–5.2)
ALP SERPL-CCNC: 70 U/L (ref 40–129)
ALT SERPL W P-5'-P-CCNC: 39 U/L (ref 10–50)
ANION GAP SERPL CALCULATED.3IONS-SCNC: 11 MMOL/L (ref 7–15)
AST SERPL W P-5'-P-CCNC: 32 U/L (ref 10–50)
BILIRUB SERPL-MCNC: 0.6 MG/DL
BUN SERPL-MCNC: 15.8 MG/DL (ref 6–20)
CALCIUM SERPL-MCNC: 9.6 MG/DL (ref 8.6–10)
CHLORIDE SERPL-SCNC: 105 MMOL/L (ref 98–107)
CHOLEST SERPL-MCNC: 172 MG/DL
CREAT SERPL-MCNC: 1.39 MG/DL (ref 0.67–1.17)
DEPRECATED HCO3 PLAS-SCNC: 24 MMOL/L (ref 22–29)
GFR SERPL CREATININE-BSD FRML MDRD: 63 ML/MIN/1.73M2
GLUCOSE SERPL-MCNC: 102 MG/DL (ref 70–99)
HDLC SERPL-MCNC: 43 MG/DL
LDLC SERPL CALC-MCNC: 70 MG/DL
NONHDLC SERPL-MCNC: 129 MG/DL
POTASSIUM SERPL-SCNC: 4.4 MMOL/L (ref 3.4–5.3)
PROT SERPL-MCNC: 7.1 G/DL (ref 6.4–8.3)
SODIUM SERPL-SCNC: 140 MMOL/L (ref 136–145)
TRIGL SERPL-MCNC: 294 MG/DL

## 2023-06-01 NOTE — RESULT ENCOUNTER NOTE
Ricardo    Your lab tests are complete and I have reviewed the results.     - Your total cholesterol is normal (< 200), - LDL (bad cholesterol) is normal, - HDL (good cholesterol) is normal, - Triglycerides are HIGH (>150). They can be affected by sweets, alcohol, pasta, bread and potatoes.  - Your A1c is in the pre-diabetic range and we want to make sure it doesn't reach 6.5%.  Try to decrease sugars and carbohydrates from your diet and increase exercise to keep those numbers down.  We'll recheck next year.  - creatinine is slightly high, this is a measure of kidney function.  Make sure you are staying well hydrated.      Recheck in one year.     If you have any questions or concerns, please feel free to call or send a [employer] [place] message.    Sincerely,  Flo Olmos PA-C

## 2024-08-03 ENCOUNTER — HEALTH MAINTENANCE LETTER (OUTPATIENT)
Age: 49
End: 2024-08-03

## 2025-08-16 ENCOUNTER — HEALTH MAINTENANCE LETTER (OUTPATIENT)
Age: 50
End: 2025-08-16